# Patient Record
Sex: FEMALE | Race: WHITE
[De-identification: names, ages, dates, MRNs, and addresses within clinical notes are randomized per-mention and may not be internally consistent; named-entity substitution may affect disease eponyms.]

---

## 2023-01-20 ENCOUNTER — HOSPITAL ENCOUNTER (OUTPATIENT)
Dept: HOSPITAL 47 - ORWHC2ENDO | Age: 53
End: 2023-01-20
Attending: INTERNAL MEDICINE
Payer: COMMERCIAL

## 2023-01-20 VITALS — RESPIRATION RATE: 16 BRPM

## 2023-01-20 VITALS — DIASTOLIC BLOOD PRESSURE: 85 MMHG | SYSTOLIC BLOOD PRESSURE: 121 MMHG | HEART RATE: 91 BPM

## 2023-01-20 VITALS — BODY MASS INDEX: 70.4 KG/M2

## 2023-01-20 DIAGNOSIS — I10: ICD-10-CM

## 2023-01-20 DIAGNOSIS — Z79.51: ICD-10-CM

## 2023-01-20 DIAGNOSIS — Z79.890: ICD-10-CM

## 2023-01-20 DIAGNOSIS — Z79.84: ICD-10-CM

## 2023-01-20 DIAGNOSIS — Z88.8: ICD-10-CM

## 2023-01-20 DIAGNOSIS — M19.90: ICD-10-CM

## 2023-01-20 DIAGNOSIS — K21.9: ICD-10-CM

## 2023-01-20 DIAGNOSIS — F41.9: ICD-10-CM

## 2023-01-20 DIAGNOSIS — E11.40: ICD-10-CM

## 2023-01-20 DIAGNOSIS — R19.4: Primary | ICD-10-CM

## 2023-01-20 DIAGNOSIS — J44.9: ICD-10-CM

## 2023-01-20 DIAGNOSIS — F17.210: ICD-10-CM

## 2023-01-20 DIAGNOSIS — Z98.890: ICD-10-CM

## 2023-01-20 DIAGNOSIS — Z79.82: ICD-10-CM

## 2023-01-20 DIAGNOSIS — E07.9: ICD-10-CM

## 2023-01-20 DIAGNOSIS — Z79.899: ICD-10-CM

## 2023-01-20 LAB — GLUCOSE BLD-MCNC: 181 MG/DL (ref 70–110)

## 2023-01-20 PROCEDURE — 45380 COLONOSCOPY AND BIOPSY: CPT

## 2023-01-20 PROCEDURE — 81025 URINE PREGNANCY TEST: CPT

## 2023-01-20 PROCEDURE — 88305 TISSUE EXAM BY PATHOLOGIST: CPT

## 2023-01-20 NOTE — P.PCN
Date of Procedure: 01/20/23


Procedure(s) Performed: 


BRIEF HISTORY: Patient is a 52-year-old pleasant white female scheduled for an 

elective colonoscopy as a part of evaluation change in bowel habits for the last

6 months duration.  She is been having alternating diarrhea and constipation but

no rectal bleeding.





PROCEDURE PERFORMED: Colonoscopy with random biopsy. 





PREOPERATIVE DIAGNOSIS: Change in bowel habits. 





IV sedation per Anesthesia. 





PROCEDURE: After informed consent was obtained, the patient, was brought into 

the endoscopy unit. IV sedation was administered by Anesthesia under continuous 

monitoring.  Digital rectal examination was normal. Initially the Olympus CF-160

flexible video colonoscope was then inserted in the rectum, gradually advanced 

into the cecum without any difficulty. Careful examination was performed as the 

scope was gradually being withdrawn. Ileocecal valve and the appendiceal orifice

were visualized and appeared normal.  Prep was excellent. Mucosa of the cecum, 

ascending colon, transverse colon, descending colon, sigmoid colon, and rectum 

appeared normal.  Random biopsies were done from the ascending and descending 

colon to rule out microscopic/collagenous colitis.  Retroflexion was performed 

in the rectum and no lesions were seen. The patient tolerated the procedure 

well. 





IMPRESSION: Normal-appearing colon from rectum to cecum with no evidence of 

colorectal neoplasia .





RECOMMENDATIONS:  Findings of this examination were discussed with the patient 

as well as a family.  She was advised to follow with the biopsy results.  

Recommend repeat screening colonoscopy in 10 years.

## 2023-06-01 ENCOUNTER — HOSPITAL ENCOUNTER (OUTPATIENT)
Dept: HOSPITAL 47 - RADCTMAIN | Age: 53
Discharge: HOME | End: 2023-06-01
Attending: FAMILY MEDICINE
Payer: COMMERCIAL

## 2023-06-01 DIAGNOSIS — N85.8: Primary | ICD-10-CM

## 2023-06-01 DIAGNOSIS — N92.6: ICD-10-CM

## 2023-06-01 DIAGNOSIS — R19.04: ICD-10-CM

## 2023-06-01 LAB — BUN SERPL-SCNC: 11 MG/DL (ref 7–17)

## 2023-06-01 PROCEDURE — 82565 ASSAY OF CREATININE: CPT

## 2023-06-01 PROCEDURE — 84520 ASSAY OF UREA NITROGEN: CPT

## 2023-06-01 PROCEDURE — 74178 CT ABD&PLV WO CNTR FLWD CNTR: CPT

## 2023-06-01 PROCEDURE — 36415 COLL VENOUS BLD VENIPUNCTURE: CPT

## 2023-06-02 NOTE — CT
EXAMINATION TYPE: CT abdomen pelvis wo/w con

 

DATE OF EXAM: 6/1/2023

 

COMPARISON: 12/4/2020

 

HISTORY: LUQ pain

 

CT DLP: 7062 mGycm

Automated exposure control for dose reduction was used.

 

CONTRAST: 

CT scan of the abdomen pelvis is performed without and with IV Contrast, patient injected with 100 mL
 of Isovue 300.

 

FINDINGS-exam limited by artifact.

 

LUNG BASES-  subsegmental changes right lung base most typical of atelectasis.

 

LIVER/GB-   reduced attenuation of the liver suggestive of fatty change. Tiny gallstone not excluded.


 

PANCREAS-  No gross abnormality is seen. 

 

SPLEEN-  No gross abnormality is seen. 

 

ADRENALS-  stable 6 mm nodule left adrenal gland too small to characterize but likely related to an i
ncidental adenoma

 

KIDNEYS/BLADDER- no hydronephrosis or nephrolithiasis. There is a sub-5 mm hypodensity within bilater
al kidney too small to characterize..

   

BOWEL-  nonspecific

  

LYMPH NODES-  No greater than 1cm abdominal or pelvic lymph nodes are appreciated. 

 

OSSEOUS STRUCTURES-  hypertrophic and degenerative changes spine. Arthropathy of the hips.

 

OTHER-  there is lobulation of the uterus recommend pelvic ultrasound. Images are degraded by artifac
t. Aorta normal caliber. 

 

IMPRESSION- 

1. Lobulated contour to the uterus. Possibly related to uterine fibroid. Recommend pelvic ultrasound 
given limitations of this exam for further evaluation.

## 2023-10-19 ENCOUNTER — HOSPITAL ENCOUNTER (OUTPATIENT)
Dept: HOSPITAL 47 - RADMAMWWP | Age: 53
Discharge: HOME | End: 2023-10-19
Attending: FAMILY MEDICINE
Payer: COMMERCIAL

## 2023-10-19 DIAGNOSIS — Z12.31: Primary | ICD-10-CM

## 2023-10-19 DIAGNOSIS — Z80.3: ICD-10-CM

## 2023-10-19 PROCEDURE — 77063 BREAST TOMOSYNTHESIS BI: CPT

## 2023-10-19 PROCEDURE — 77067 SCR MAMMO BI INCL CAD: CPT

## 2023-10-23 NOTE — MM
Reason for Exam: Screening  (asymptomatic). 

Last mammogram was performed 1 year(s) and 8 month(s) ago. 





Patient History: 

Menarche at age 13. First Full-Term Pregnancy at age 20.

Maternal aunt had breast cancer, age 34. Mother had breast cancer. 





Risk Values: 

Annalisa 5 year model risk: 2.0%.

NCI Lifetime model risk: 15.5%.





Prior Study Comparison: 

2/20/2017 Bilateral Screening Mammogram, [facility]. 3/22/2019 Bilateral Screening Mammogram, [facility].

2/10/2022 Bilateral Screening Mammogram, [facility]. 





Tissue Density: 

There are scattered fibroglandular densities.





Findings: 

Analyzed By CAD. 

Unchanged asymmetrically prominent left axillary lymph nodes. There is no suspicious group of

microcalcifications or new suspicious mass in either breast. 





Overall Assessment: Benign, BI-RAD 2





Management: 

Screening Mammogram of both breasts in 1 year.

.



Patient should continue monthly self-breast exams.  A clinical breast exam by your physician is

recommended on an annual basis.

This exam should not preclude additional follow-up of suspicious palpable abnormalities.



Note on Annalisa scores and lifetime risk:

1. A Annalisa score greater than 3% is considered moderate risk. If this is the case, consider

specialist referral to assess eligibility for a risk reducing agent.

2. If overall lifetime risk for the development of breast cancer is 20% or higher, the patient may

qualify for future screening with alternating mammogram and breast MRI.



Electronically signed and approved by: Roscoe Griggs M.D. Radiologist

## 2024-12-16 ENCOUNTER — HOSPITAL ENCOUNTER (OUTPATIENT)
Dept: HOSPITAL 47 - 3 N SLEEP | Age: 54
End: 2024-12-16
Payer: COMMERCIAL

## 2024-12-16 ENCOUNTER — HOSPITAL ENCOUNTER (OUTPATIENT)
Dept: HOSPITAL 47 - RADMAMWWP | Age: 54
Discharge: HOME | End: 2024-12-16
Attending: FAMILY MEDICINE
Payer: COMMERCIAL

## 2024-12-16 VITALS
HEART RATE: 110 BPM | SYSTOLIC BLOOD PRESSURE: 121 MMHG | DIASTOLIC BLOOD PRESSURE: 81 MMHG | RESPIRATION RATE: 18 BRPM | TEMPERATURE: 98.4 F

## 2024-12-16 DIAGNOSIS — Z80.3: ICD-10-CM

## 2024-12-16 DIAGNOSIS — Z99.89: ICD-10-CM

## 2024-12-16 DIAGNOSIS — Z79.899: ICD-10-CM

## 2024-12-16 DIAGNOSIS — E78.5: ICD-10-CM

## 2024-12-16 DIAGNOSIS — Z12.31: Primary | ICD-10-CM

## 2024-12-16 DIAGNOSIS — E66.01: ICD-10-CM

## 2024-12-16 DIAGNOSIS — Z79.51: ICD-10-CM

## 2024-12-16 DIAGNOSIS — J44.9: ICD-10-CM

## 2024-12-16 DIAGNOSIS — G47.33: Primary | ICD-10-CM

## 2024-12-16 DIAGNOSIS — Z88.8: ICD-10-CM

## 2024-12-16 DIAGNOSIS — Z86.711: ICD-10-CM

## 2024-12-16 DIAGNOSIS — E11.51: ICD-10-CM

## 2024-12-16 DIAGNOSIS — F17.200: ICD-10-CM

## 2024-12-16 DIAGNOSIS — R92.323: ICD-10-CM

## 2024-12-16 DIAGNOSIS — Z86.718: ICD-10-CM

## 2024-12-16 DIAGNOSIS — F41.9: ICD-10-CM

## 2024-12-16 DIAGNOSIS — Z79.84: ICD-10-CM

## 2024-12-16 DIAGNOSIS — Z78.0: ICD-10-CM

## 2024-12-16 DIAGNOSIS — M53.9: ICD-10-CM

## 2024-12-16 DIAGNOSIS — Z98.890: ICD-10-CM

## 2024-12-16 PROCEDURE — 77063 BREAST TOMOSYNTHESIS BI: CPT

## 2024-12-16 PROCEDURE — 99211 OFF/OP EST MAY X REQ PHY/QHP: CPT

## 2024-12-16 PROCEDURE — 77067 SCR MAMMO BI INCL CAD: CPT

## 2024-12-16 NOTE — P.SLEEP
History of Present Illness


DATE: 2024





CONSULTATION/NEW PATIENT EVALUATION





HISTORY OF PRESENT ILLNESS/SLEEP-WAKE EVALUATION:   54-year-old lady had been 

evaluated in the sleep center for obstructive sleep apnea hypopnea syndrome.  

Patient has history of obstructive sleep apnea since 2017.  She continue to use 

CPAP equipment every night for the whole night.  Patient did not bring CPAP unit

with her, but according to patient CPAP unit is about 3-year-old and works well.

 I checked information from her CPAP on [employer].  Apnea hypopnea index is 0.31, 

which is normal.  Patient using her CPAP equipment 100% of nights, average 9 

hours per night.  Leak is 4.6 L/min which is normal range.





SLEEP SCHEDULE: Usually sleep schedule from 111 AM until 9 AM11 AM.





FALLING ASLEEP: No problems with falling asleep.





DURING SLEEP: Patient is using your CPAP equipment, does not snore and may wake 

up several times from sleep.  No history of hypnogogical hallucinations, sleep 

paralysis, or cataplexy.





DURING THE DAY/WAKE STATE: During the day patient may have problems with memory,

concentration, irritability depression, anxiety.  Cascade sleepiness scale is 9.

 Patient does not take naps.





PAST MEDICAL HISTORY:   COPD patient on 3 L/min oxygen supplement , 

hypothyroidism, diabetes mellitus 2, hypertension, hyperlipidemia, peripheral 

neuropathy, anxiety history of DVT, history of pulmonary embolism.





PAST SURGICAL HISTORY: Green filter insertion, .





MEDICATIONS:   Levothyroxine 75 mcg once a day, Zetia 10 mg once a day, 

olmesartanhydrochlorothiazide once a day, Lasix 20 to 40 mg once a day, 

Jardiance 10 mg once a day, Xarelto 20 mg once a day, metformin 1000 mg once a 

day, Norco 7.5-325 mg once a day, alprazolam 0.5 mg twice a day, albuterol, 

Rybelsus.





SOCIAL HISTORY:   Please see below.





FAMILY HISTORY: Please see below.





REVIEW OF SYSTEMS: Awakenings from sleep. No fevers. No double vision. No recent

chest pain. No shortness of breath. No abdominal pain. No bleeding episodes. No 

blood in urine. No seizure episodes. 





PHYSICAL EXAMINATION: 


GENERAL: A pleasant patient without any distress. 


VITAL SIGNS: Please see below, weight 460 pounds, BMI 74.2.


HEENT: PERRLA, EOMI. Evaluation of oropharynx showed tongue protrudes midline, 

low position of soft palate Mallampati 3.


NECK: Supple. No JVD. Thyroid is not palpable.   21 inches in circumference.


LUNGS: Clear to percussion and to auscultation. Good air exchange. No wheezing 

or rhonchi. 


HEART: S1, S2 regular. No murmurs, gallops or rubs. 


ABDOMEN: Soft and nontender. Bowel sounds are present. No organomegaly 

appreciated. 


EXTREMITIES: No clubbing or cyanosis. 


CNS: Awake, alert, and oriented x3. Cranial nerves 2 to 7 intact. There is no 

fasciculation or atrophy noted. No focal deficits observed. 





ASSESSMENT:


1.  Obstructive sleep apnea hypopnea syndrome since 2017, patient continued to 

use CPAP equipment every night for the whole night, normal apnea-hypopnea index 

by reading from [employer].  At the same time patient may have several awakenings 

from sleep.  Low position of soft palate.  Wide neck 21 inches in circumference.

 Obstructive sleep apnea hypopnea syndrome.


2.  Patient is on treatment with Norco which may increase risk for central sleep

apneas.


3.  Morbid obesity, .  According to patient she will lost some weight 

since starting using CPAP treatment in the past.


4.  Diabetes mellitus type 2.


5   anxiety.


6 .  COPD on 3 L/min oxygen supplement .


7.  Hyperlipidemia.


8.  Peripheral neuropathy.


9 .  Back problems.


10.  History of DVT.


11.  History of pulmonary embolism.


12.  Status post Greenfilter insertion.








PLAN: 


1.   Patient will continue to use show CPAP equipment every night for the whole 

night with oxygen supplement. 


2.   Will maintain all necessary CPAP supplies. 


3.   Preferable position during sleep on the side. 


4.   No driving if patient feels any sleepiness. Patient is aware of civil and 

criminal liability for unsafe driving. 


5.                 Sleep hygiene with regular sleep time for at least 7.5-8 

hours.


6.   Watching and aggressive losing weight. 


7.   Follow-up visit in 4 months.  We may consider to repeat titration study.  

Patient may have benefits with using BiPAP equipment instead of CPAP.





Thank you very much for referring this patient for consultation.





Sincerely,











Silviano Barcenas MD, PhD, FAASM.


Diplomat of American Board of Sleep Medicine,


Sleep Medicine Board by American Board of Medical Specialities


American Board of Internal Medicine


Medical Director of [facility]











cc: Que Mcdermott DO, Jose Levy DO





Past Medical History


Past Medical History: COPD, Diabetes Mellitus, GERD/Reflux, Hypertension, 

Osteoarthritis (OA), Pulmonary Embolus (PE), Thyroid Disorder


Additional Past Medical History / Comment(s): hypothyroid. morbid obesity, 

severe neuropathy, abdominal pain and abdmonial distention


History of Any Multi-Drug Resistant Organisms: None Reported


Past Surgical History:  Section


Additional Past Surgical History / Comment(s): right ovary removed, pilinoidal 

cyst


Past Anesthesia/Blood Transfusion Reactions: No Reported Reaction


Past Psychological History: Anxiety


Smoking Status: Current every day smoker


Past Alcohol Use History: None Reported


Past Drug Use History: Marijuana


Additional Drug Use History / Comment(s): 1 pack per day started in .  

edibles on occassion aware to sustain for 24 hours prior to procedure





- Past Family History


  ** Father


Family Medical History: Hypertension, Pneumonia, Sleep Apnea/CPAP/BIPAP


Additional Family Medical History / Comment(s): Emphysema, lung problems, 

bronchitis





  ** Mother


Family Medical History: Cancer, GERD/Reflux


Additional Family Medical History / Comment(s): bladder, breast, Mental Illness 

(Depression), bronchitis





Medications and Allergies


                                Home Medications











 Medication  Instructions  Recorded  Confirmed  Type


 


ALPRAZolam [Xanax] 0.5 mg PO BID PRN 17 History


 


Ergocalciferol [Vitamin D2 50,000 unit PO TU 17 History





(DRISDOL)]    


 


Levothyroxine Sodium [Synthroid] 75 mcg PO HS 17 History


 


Multivitamins, Thera [Multivitamin 1 tab PO DAILY #30 tablet 17 

Rx





(formulary)]    


 


Albuterol Inhaler [Ventolin Hfa 2 puff INHALATION RT-TID 17 H

istory





Inhaler]    


 


Aspirin [Durlaza] 162.5 mg PO DAILY 23 History


 


Edibles 10 mg PO HS PRN 23 History


 


Ertugliflozin Pidolate [Steglatro] 15 mg PO DAILY 23 History


 


FLUoxetine HCL [PROzac] 40 mg PO AC-LUNCH 23 History


 


Famotidine [Pepcid] 20 mg PO BID 23 History


 


Fish Oil/Dha/Epa [Fish Oil 1,200 1 each PO DAILY 23 History





mg Fish Oil]    


 


Gabapentin [Neurontin] 600 mg PO HS 23 History


 


HYDROcodone/APAP 7.5-325MG [Norco 1 tab PO TID PRN 23 History





7.5-325]    


 


Melatonin 9 mg PO HS PRN 23 History


 


Olmesartan/Hydrochlorothiazide 1 each PO AC-LUNCH 23 History





[Olmesartan-Hctz 20-12.5 mg Tab]    


 


Semaglutide [Rybelsus] 7 mg PO DAILY 23 History


 


metFORMIN HCL 1,000 mg PO PC-SUPPER 23 History








                                    Allergies











Allergy/AdvReac Type Severity Reaction Status Date / Time


 


Statins-HMG-CoA Reductase AdvReac Severe Muscle Verified 23 09:42





Inhibitor   Cramps  





[Statins-Hmg-Coa Reductase     





Inhibitor]     














Physical Exam


Vitals: 


                                   Vital Signs











  Temp Pulse Resp BP Pulse Ox


 


 24 14:34  98.4 F  110 H  18  121/81  96








                                Intake and Output











 24





 06:59 14:59 22:59


 


Other:   


 


  Weight  208.652 kg 














Sleep Note





- Sleep Data


ESS Total: 9





- Sleep Note


Sleep Note: 


Temperature: 98.4 F


Pulse Rate: 110


Respiratory Rate: 18


Blood Pressure: 121/81


SpO2: 96


Height: 5 ft 6 in


Weight: 208.652 kg


BMI: 


Neck Circumference: 21

## 2024-12-17 NOTE — MM
Reason for Exam: Screening  (asymptomatic). 

Last mammogram was performed 1 year(s) and 2 month(s) ago. 





Patient History: 

Menarche at age 13. First Full-Term Pregnancy at age 20. Postmenopausal. Patient has history of

breast feeding.

Maternal aunt had breast cancer, age 34. Mother had breast cancer. 





Risk Values: 

Annalisa 5 year model risk: 2.0%.

NCI Lifetime model risk: 15.3%.





Prior Study Comparison: 

3/22/2019 Bilateral Screening Mammogram, [facility]. 2/10/2022 Bilateral Screening Mammogram, [facility].

10/19/2023 Bilateral MG 3D screening mammo w/cad, [facility]. 





Tissue Density: 

There are scattered areas of fibroglandular density.





Findings: 

Analyzed By CAD. 

Right breast: There is no suspicious group of microcalcifications or new suspicious mass.



Left breast: There is no suspicious group of microcalcifications or new suspicious mass. 





Overall Assessment: Negative, BI-RAD 1





Management: 

Screening Mammogram of both breasts in 1 year.

[facility] will attempt to contact patient to return for supplemental views and

ultrasound if indicated.



Patient should continue monthly self-breast exams.  A clinical breast exam by your physician is

recommended on an annual basis.

This exam should not preclude additional follow-up of suspicious palpable abnormalities.



Note on Annalisa scores and lifetime risk:

1. A Annalisa score greater than 3% is considered moderate risk. If this is the case, consider

specialist referral to assess eligibility for a risk reducing agent.

2. If overall lifetime risk for the development of breast cancer is 20% or higher, the patient may

qualify for future screening with alternating mammogram and breast MRI.



[facility], Workstation: [id number], 12/17/2024 8:37 AM.



Electronically signed and approved by: Jose Yeh DO